# Patient Record
Sex: MALE | URBAN - METROPOLITAN AREA
[De-identification: names, ages, dates, MRNs, and addresses within clinical notes are randomized per-mention and may not be internally consistent; named-entity substitution may affect disease eponyms.]

---

## 2022-08-08 ENCOUNTER — APPOINTMENT (RX ONLY)
Dept: URBAN - METROPOLITAN AREA CLINIC 168 | Facility: CLINIC | Age: 32
Setting detail: DERMATOLOGY
End: 2022-08-08

## 2022-08-08 DIAGNOSIS — L40.0 PSORIASIS VULGARIS: ICD-10-CM

## 2022-08-08 PROCEDURE — ? COUNSELING

## 2022-08-08 PROCEDURE — ? ADDITIONAL NOTES

## 2022-08-08 PROCEDURE — ? TALTZ INJECTION

## 2022-08-08 PROCEDURE — ? SEPARATE AND IDENTIFIABLE DOCUMENTATION

## 2022-08-08 PROCEDURE — ? TALTZ INITIATION

## 2022-08-08 PROCEDURE — ? ORDER TESTS

## 2022-08-08 PROCEDURE — 96372 THER/PROPH/DIAG INJ SC/IM: CPT

## 2022-08-08 PROCEDURE — 99204 OFFICE O/P NEW MOD 45 MIN: CPT | Mod: 25

## 2022-08-08 ASSESSMENT — LOCATION ZONE DERM
LOCATION ZONE: ARM
LOCATION ZONE: FEET
LOCATION ZONE: LEG

## 2022-08-08 ASSESSMENT — LOCATION SIMPLE DESCRIPTION DERM
LOCATION SIMPLE: LEFT HEEL
LOCATION SIMPLE: RIGHT ANKLE
LOCATION SIMPLE: LEFT ANKLE
LOCATION SIMPLE: LEFT ELBOW
LOCATION SIMPLE: RIGHT HEEL
LOCATION SIMPLE: RIGHT THIGH
LOCATION SIMPLE: LEFT THIGH
LOCATION SIMPLE: RIGHT ELBOW

## 2022-08-08 ASSESSMENT — LOCATION DETAILED DESCRIPTION DERM
LOCATION DETAILED: LEFT ANKLE
LOCATION DETAILED: RIGHT ELBOW
LOCATION DETAILED: LEFT ANTERIOR PROXIMAL THIGH
LOCATION DETAILED: RIGHT ANKLE
LOCATION DETAILED: LEFT HEEL
LOCATION DETAILED: RIGHT HEEL
LOCATION DETAILED: RIGHT ANTERIOR PROXIMAL THIGH
LOCATION DETAILED: LEFT ELBOW

## 2022-08-08 ASSESSMENT — PGA PSORIASIS: PGA PSORIASIS 2020: MILD

## 2022-08-08 ASSESSMENT — BSA PSORIASIS: % BODY COVERED IN PSORIASIS: 6

## 2022-08-08 NOTE — HPI: OTHER
Condition:: psoriasis
Please Describe Your Condition:: is a new patient who is being seen for a chief complaint of psoriasis and PsA. \\n\\n- Patient has had psoriasis since childhood.\\n- Recently managed by a Gagan RATLIFF\\n- Currently on Humira, duration approx 2 years.  Losing his response \\n- Disease includes h/o significant scalp, nail and plaque disease on body, along with joint disease primarily in hands, feet, hips.\\n\\n- Over the years, patient was tried many topicals and has tried two different type of biologics (Otezla, Humira) \\n\\n- No stressors such as lifestyle, smoking or drinking which appear to induce flares. \\n- Current active areas include: hands, arms, nails and feet, along with joints.\\n\\n- As his disease is creeping back while on Humira, he is interested in a transition of his therapy.\\n\\n- No h/o TB, Hepatitis, [place] Fever.

## 2022-08-08 NOTE — PROCEDURE: ADDITIONAL NOTES
Detail Level: Simple
Render Risk Assessment In Note?: no
Patient Management Risk Assessment: Moderate
Additional Notes: - PGA is a 2-3 now, while on Humira, with disease in active worsening phase. \\n- Patient has had psoriasis since childhood - managed w/ Humira x 2 years, by Gagan RATLIFF \\n- Over the years, patient was tried many topical steroids, and has tried two different type of systemic agents: Otezla and Humira.\\n- Patient has been on Humira for about 2 years, and is losing his former response.   Last dose two weeks ago today.\\n\\n- Active areas include: hands, arms, nails and feet \\n- Patient reports psoriatic arthritis in feet and hands, hips\\n\\n- Patient is wanting to discontinue Humira and start new medication to clear psoriasis, treat PsA\\n- Recommend initiation of Taltz.  Counseling provided as to potential risks/benefits/alternatives.  Handout/brochure given on Taltz.  Questions answered.  Pt expressed interest in initiating today if possible.\\n\\nPlan: \\n- Discontinue Humira \\n- Initiate Taltz.  Two (induction) doses, each of 80 mg, given today.  Instructed to continue at home w/ sample given at 80 mg q 14 days until week 12, then q 28 days thereafter. \\n- Pt has agreed to enroll in [employer] research trial, as well as the [employer] registry\\n- Complete labs such that authorization may be initiated.\\n- F/u 1 month for visit w/ Estuardo, along with [employer] trial research visit

## 2022-08-08 NOTE — PROCEDURE: ORDER TESTS
Expected Date Of Service: 08/08/2022
Billing Type: Third-Party Bill
Performing Laboratory: [id number]
Bill For Surgical Tray: no
Lab Facility: [id number]

## 2022-08-08 NOTE — PROCEDURE: TALTZ INITIATION
Pregnancy And Lactation Warning Text: The risk during pregnancy and breastfeeding is uncertain with this medication.
Taltz Dosing: 160mg SC x 1 at weeks 0 then 80mg SC at weeks 2, 4, 6, 8, 10 and 12 then 80mg SC every four weeks
Is Soriatane Contraindicated?: No
Diagnosis (Required): Psoriasis
Taltz Monitoring Guidelines: A yearly test for tuberculosis is required while taking Taltz.
Detail Level: Zone

## 2022-08-08 NOTE — PROCEDURE: TALTZ INJECTION
Render If Medication Purchased By Clinic In Visit Note?: Yes
Consent: The risks of pain and injection site reactions were reviewed with the patient prior to the injection.
Ndc (80 Mg/Ml Syringe): [address]-78
Detail Level: None
Was The Medication Purchased By The Clinic?: No
Expiration Date (Optional): 03/07/2023
Additional Comments: 2 separate boxes to make 160 mg/ml
Syringe Size Used (Required For Enhanced Ndc): 80 mg/ml Prefilled Pen
29580 Billing Preferences: 1
Taltz Amount: 160 mg
J-Code: 
Ndc (80 Mg/Ml Pen): [address]-22
Lot # (Optional): [id number]
Administered By (Optional): CLP

## 2022-09-12 ENCOUNTER — APPOINTMENT (RX ONLY)
Dept: URBAN - METROPOLITAN AREA CLINIC 168 | Facility: CLINIC | Age: 32
Setting detail: DERMATOLOGY
End: 2022-09-12

## 2022-09-12 DIAGNOSIS — L40.0 PSORIASIS VULGARIS: ICD-10-CM | Status: IMPROVED

## 2022-09-12 PROCEDURE — ? ADDITIONAL NOTES

## 2022-09-12 PROCEDURE — 99214 OFFICE O/P EST MOD 30 MIN: CPT

## 2022-09-12 PROCEDURE — ? TALTZ MONITORING

## 2022-09-12 PROCEDURE — ? COUNSELING

## 2022-09-12 ASSESSMENT — LOCATION SIMPLE DESCRIPTION DERM
LOCATION SIMPLE: RIGHT ANKLE
LOCATION SIMPLE: LEFT ELBOW
LOCATION SIMPLE: RIGHT HEEL
LOCATION SIMPLE: RIGHT ELBOW
LOCATION SIMPLE: LEFT HEEL
LOCATION SIMPLE: LEFT ANKLE

## 2022-09-12 ASSESSMENT — LOCATION DETAILED DESCRIPTION DERM
LOCATION DETAILED: LEFT ELBOW
LOCATION DETAILED: LEFT HEEL
LOCATION DETAILED: RIGHT HEEL
LOCATION DETAILED: LEFT ANKLE
LOCATION DETAILED: RIGHT ELBOW
LOCATION DETAILED: RIGHT ANKLE

## 2022-09-12 ASSESSMENT — LOCATION ZONE DERM
LOCATION ZONE: ARM
LOCATION ZONE: LEG
LOCATION ZONE: FEET

## 2022-09-12 ASSESSMENT — BSA PSORIASIS: % BODY COVERED IN PSORIASIS: 3

## 2022-09-12 ASSESSMENT — PGA PSORIASIS: PGA PSORIASIS 2020: MILD

## 2022-09-12 NOTE — PROCEDURE: TALTZ MONITORING
Length Of Therapy: 1 month
Add High Risk Medication Management Associated Diagnosis?: No
Detail Level: Zone
Comments: Initiate on 8/8/2022

## 2022-09-12 NOTE — PROCEDURE: ADDITIONAL NOTES
Detail Level: Simple
Render Risk Assessment In Note?: no
Patient Management Risk Assessment: Moderate
Additional Notes: *** (50% improvement)\\n\\nTried and failed numerous topicals and biologic medications: Humira , Ketoconazole 2%, Fluocinonide, Halobetasol 0.05%, Otezla, Cetirizine 10 mg\\n\\nPatient would like to stay up-to-date with receiving immunizations which would be a contraindication to taking MTX as this reduces the efficiency of vaccinations.\\n\\nD/C Humira\\Juan Citiate Taltz on 8/8/2022\\n\\n-He has tolerated medication well. He has noticed he is more dehydrated, but no other adverse effects. \\n-Patient has noticed moderate improvement of his joint pain since initiating Taltz last visit. Severity has reduced from a 10/10 to an average of a 5/10.

## 2022-09-30 ENCOUNTER — RX ONLY (OUTPATIENT)
Age: 32
Setting detail: RX ONLY
End: 2022-09-30

## 2022-09-30 RX ORDER — IXEKIZUMAB 80 MG/ML
INJECTION, SOLUTION SUBCUTANEOUS AS DIRECTED
Qty: 1 | Refills: 1 | COMMUNITY
Start: 2022-09-30

## 2022-09-30 RX ORDER — IXEKIZUMAB 80 MG/ML
INJECTION, SOLUTION SUBCUTANEOUS AS DIRECTED
Qty: 1 | Refills: 5

## 2022-10-11 ENCOUNTER — RX ONLY (OUTPATIENT)
Age: 32
Setting detail: RX ONLY
End: 2022-10-11

## 2022-10-11 RX ORDER — DESONIDE 0.5 MG/G
OINTMENT TOPICAL
Qty: 60 | Refills: 0 | Status: ERX | COMMUNITY
Start: 2022-10-11

## 2022-10-24 ENCOUNTER — RX ONLY (OUTPATIENT)
Age: 32
Setting detail: RX ONLY
End: 2022-10-24

## 2022-10-24 RX ORDER — IXEKIZUMAB 80 MG/ML
INJECTION, SOLUTION SUBCUTANEOUS AS DIRECTED
Qty: 1 | Refills: 5 | Status: ERX

## 2023-01-24 ENCOUNTER — APPOINTMENT (RX ONLY)
Dept: URBAN - METROPOLITAN AREA CLINIC 168 | Facility: CLINIC | Age: 33
Setting detail: DERMATOLOGY
End: 2023-01-24

## 2023-01-24 DIAGNOSIS — L40.0 PSORIASIS VULGARIS: ICD-10-CM | Status: IMPROVED

## 2023-01-24 PROBLEM — L30.9 DERMATITIS, UNSPECIFIED: Status: ACTIVE | Noted: 2023-01-24

## 2023-01-24 PROCEDURE — ? COUNSELING

## 2023-01-24 PROCEDURE — ? ORDER TESTS

## 2023-01-24 PROCEDURE — ? ADDITIONAL NOTES

## 2023-01-24 PROCEDURE — 99214 OFFICE O/P EST MOD 30 MIN: CPT

## 2023-01-24 PROCEDURE — ? TALTZ MONITORING

## 2023-01-24 ASSESSMENT — LOCATION DETAILED DESCRIPTION DERM
LOCATION DETAILED: LEFT ANKLE
LOCATION DETAILED: RIGHT HEEL
LOCATION DETAILED: LEFT POSTERIOR SHOULDER
LOCATION DETAILED: RIGHT ANKLE
LOCATION DETAILED: LEFT HEEL
LOCATION DETAILED: RIGHT ELBOW
LOCATION DETAILED: LEFT ELBOW

## 2023-01-24 ASSESSMENT — LOCATION ZONE DERM
LOCATION ZONE: LEG
LOCATION ZONE: ARM
LOCATION ZONE: FEET

## 2023-01-24 ASSESSMENT — LOCATION SIMPLE DESCRIPTION DERM
LOCATION SIMPLE: RIGHT HEEL
LOCATION SIMPLE: LEFT SHOULDER
LOCATION SIMPLE: LEFT ELBOW
LOCATION SIMPLE: LEFT HEEL
LOCATION SIMPLE: LEFT ANKLE
LOCATION SIMPLE: RIGHT ELBOW
LOCATION SIMPLE: RIGHT ANKLE

## 2023-01-24 ASSESSMENT — PGA PSORIASIS: PGA PSORIASIS 2020: CLEAR

## 2023-01-24 ASSESSMENT — BSA PSORIASIS: % BODY COVERED IN PSORIASIS: 0

## 2023-01-24 NOTE — PROCEDURE: TALTZ MONITORING
Length Of Therapy: 5 months
Add High Risk Medication Management Associated Diagnosis?: No
Detail Level: Zone
Comments: ***Initiated on 8/8/2022

## 2023-01-24 NOTE — PROCEDURE: ORDER TESTS
Lab Facility: [id number]
Bill For Surgical Tray: no
Billing Type: Third-Party Bill
Expected Date Of Service: 01/24/2023
Performing Laboratory: [id number]

## 2023-01-24 NOTE — PROCEDURE: ADDITIONAL NOTES
Detail Level: Simple
Render Risk Assessment In Note?: no
Patient Management Risk Assessment: Moderate
Additional Notes: ***\\nTried and Failed: Humira, Otezla, Ketoconozale 2%, Halobetasol 0.05%, Floucinonide, Betamethasone, Flurandrenolide, Cetrizine 10mg\\n- It is important to pt to stay up to date on immunizations, contraindication to MTX due to reduction in vaccine efficiency \\n- Prior areas of involvement: feet, ankles, elbows, nail pitting\\n- 8/8/22 (Prior to Initiating Taltz): BSA: 6% PGA: 2.0\\n- 9/22/22 (One month Into Taltz): BSA: 3.0% PGA: 2.0\\n- 1/24/23 (5 months into Taltz): BSA: 0.0% PGA: 0.0\\n\\n***Significantly Improved***\\n- Pt noticed moderate improvement in joint pain since Taltz, severity reduced from 10/10 to 5/10\\n- Reports morning stiffness persisting in right hand, exacerbated by cold weather, will reassess in spring \\n- Resolution of focal itching and dryness\\n- Tolerating medication well, Pt denies side affects of Gi upset and URIs\\n\\nPlan:\\n- Continue Taltz dosing as directed \\n- Advised to take Ibuprofen for days where joint pain is more severe\\n- Recommend pt soak hands in warm water and engage in physical activity in the morning to help with stiffness
rolling walker

## 2023-08-23 ENCOUNTER — RX ONLY (OUTPATIENT)
Age: 33
Setting detail: RX ONLY
End: 2023-08-23

## 2023-08-23 ENCOUNTER — APPOINTMENT (RX ONLY)
Dept: URBAN - METROPOLITAN AREA CLINIC 168 | Facility: CLINIC | Age: 33
Setting detail: DERMATOLOGY
End: 2023-08-23

## 2023-08-23 DIAGNOSIS — L40.0 PSORIASIS VULGARIS: ICD-10-CM

## 2023-08-23 PROCEDURE — ? ORDER TESTS

## 2023-08-23 PROCEDURE — 99214 OFFICE O/P EST MOD 30 MIN: CPT

## 2023-08-23 PROCEDURE — ? ADDITIONAL NOTES

## 2023-08-23 PROCEDURE — ? TALTZ MONITORING

## 2023-08-23 PROCEDURE — ? COUNSELING

## 2023-08-23 RX ORDER — IXEKIZUMAB 80 MG/ML
INJECTION, SOLUTION SUBCUTANEOUS
Qty: 1 | Refills: 5 | Status: ERX

## 2023-08-23 ASSESSMENT — LOCATION DETAILED DESCRIPTION DERM
LOCATION DETAILED: RIGHT ELBOW
LOCATION DETAILED: LEFT ELBOW
LOCATION DETAILED: LEFT ANKLE
LOCATION DETAILED: RIGHT ANKLE
LOCATION DETAILED: RIGHT HEEL
LOCATION DETAILED: LEFT HEEL

## 2023-08-23 ASSESSMENT — LOCATION ZONE DERM
LOCATION ZONE: FEET
LOCATION ZONE: ARM
LOCATION ZONE: LEG

## 2023-08-23 ASSESSMENT — LOCATION SIMPLE DESCRIPTION DERM
LOCATION SIMPLE: RIGHT HEEL
LOCATION SIMPLE: RIGHT ELBOW
LOCATION SIMPLE: RIGHT ANKLE
LOCATION SIMPLE: LEFT ANKLE
LOCATION SIMPLE: LEFT HEEL
LOCATION SIMPLE: LEFT ELBOW

## 2023-08-23 NOTE — PROCEDURE: TALTZ MONITORING
Length Of Therapy: 1 year
Add High Risk Medication Management Associated Diagnosis?: No
Detail Level: Zone
Comments: ***Initiated on 8/8/2022

## 2023-08-23 NOTE — PROCEDURE: ORDER TESTS
Lab Facility: [id number]
Billing Type: Third-Party Bill
Expected Date Of Service: 08/23/2023
Bill For Surgical Tray: no
Performing Laboratory: [id number]
Expected Date Of Service: 01/01/2024
Billing Type: Third-Party Bill

## 2023-08-23 NOTE — PROCEDURE: ADDITIONAL NOTES
Detail Level: Simple
Render Risk Assessment In Note?: no
Patient Management Risk Assessment: Moderate
Additional Notes: ***\\nTried and Failed: Humira, Otezla, Ketoconozale 2%, Halobetasol 0.05%, Floucinonide, Betamethasone, Flurandrenolide, Cetrizine 10mg\\n- It is important to pt to stay up to date on immunizations, contraindication to MTX due to reduction in vaccine efficiency \\n- Prior areas of involvement: feet, ankles, elbows, nail pitting\\n- 8/8/22 (Prior to Initiating Taltz): BSA: 6% PGA: 2.0\\n- 9/22/22 (One month Into Taltz): BSA: 3.0% PGA: 2.0\\n- 1/24/23 (5 months into Taltz): BSA: 0.0% PGA: 0.0\\n\\n***Significantly Improved***\\n- Great improvement and management for months but skipped July due to having no refills \\n- Patient reports upper GI upset but likely not related to Taltz \\n- One sample given to patient today to dose asap and he will give us his next pen that's shipped \\n- Labs for quant, CBC and CMP due now then CBC, CMP in 6 months  \\n\\nPlan:\\n- Continue Taltz dosing as directed \\n- Advised to take Ibuprofen for days where joint pain is more severe

## 2024-02-20 ENCOUNTER — APPOINTMENT (RX ONLY)
Dept: URBAN - METROPOLITAN AREA CLINIC 168 | Facility: CLINIC | Age: 34
Setting detail: DERMATOLOGY
End: 2024-02-20

## 2024-02-20 DIAGNOSIS — L40.0 PSORIASIS VULGARIS: ICD-10-CM

## 2024-02-20 PROCEDURE — ? ADDITIONAL NOTES

## 2024-02-20 PROCEDURE — 99214 OFFICE O/P EST MOD 30 MIN: CPT

## 2024-02-20 PROCEDURE — ? COUNSELING

## 2024-02-20 PROCEDURE — ? TALTZ MONITORING

## 2024-02-20 PROCEDURE — ? ORDER TESTS

## 2024-02-20 ASSESSMENT — LOCATION DETAILED DESCRIPTION DERM: LOCATION DETAILED: RIGHT AXILLARY VAULT

## 2024-02-20 ASSESSMENT — LOCATION SIMPLE DESCRIPTION DERM: LOCATION SIMPLE: RIGHT AXILLARY VAULT

## 2024-02-20 ASSESSMENT — LOCATION ZONE DERM: LOCATION ZONE: AXILLAE

## 2024-02-20 NOTE — PROCEDURE: TALTZ MONITORING
Length Of Therapy: 1 year
Add High Risk Medication Management Associated Diagnosis?: Yes
Detail Level: Zone
Comments: Initiated on 8/8/2022

## 2024-02-20 NOTE — HPI: OTHER
Condition:: Psoriasis follow up
Please Describe Your Condition:: is an established patient who is being seen for a chief complaint of Psoriasis follow up. \\n\\n- Very well controlled, no side effects reports \\n- Only affected areas: axilla \\n- Joints improved but much more improved \\n- Not using desoinde as much or at all (mainly on axilla at night)\\n\\nPlan LOV:\\n- Continue Taltz dosing as directed\\n- Advised to take Ibuprofen for days where joint pain is more severe

## 2024-02-20 NOTE — PROCEDURE: ORDER TESTS
Billing Type: Third-Party Bill
Expected Date Of Service: 02/20/2024
Performing Laboratory: [id number]
Bill For Surgical Tray: no
Lab Facility: [id number]

## 2024-02-20 NOTE — PROCEDURE: ADDITIONAL NOTES
Detail Level: Simple
Render Risk Assessment In Note?: no
Additional Notes: HISTORY:\\n- Tried and Failed: Humira, Otezla, Ketoconozale 2%, Halobetasol 0.05%, Floucinonide, Betamethasone, Flurandrenolide, Cetrizine 10mg\\n\\n- Prior areas of involvement: feet, ankles, elbows, nail pitting\\n- 8/8/22 (Prior to Initiating Taltz): BSA: 6% PGA: 2.0\\n- 9/22/22 (One month Into Taltz): BSA: 3.0% PGA: 2.0\\n- 1/24/23 (5 months into Taltz): BSA: 0.0% PGA: 0.0\\n- 8/23/23: BSA:0%, PGA:0\\n\\n\\n****WELL CONTROLLED****\\n- Only affected area: axilla \\n- Well controlled overall but does experience itch at night mainly in axilla \\n- Patient is still happy with medication, quality of life improved on mediation \\n\\nPlan:\\n- Continue Taltz every 4 weeks \\n\\n\\nNC

## 2024-08-12 ENCOUNTER — RX ONLY (OUTPATIENT)
Age: 34
Setting detail: RX ONLY
End: 2024-08-12

## 2024-08-12 RX ORDER — IXEKIZUMAB 80 MG/ML
INJECTION, SOLUTION SUBCUTANEOUS
Qty: 1 | Refills: 5 | Status: ERX | COMMUNITY
Start: 2024-08-12

## 2024-08-22 ENCOUNTER — RX ONLY (OUTPATIENT)
Age: 34
Setting detail: RX ONLY
End: 2024-08-22

## 2024-08-22 RX ORDER — HALOBETASOL PROPIONATE 0.5 MG/G
CREAM TOPICAL
Qty: 50 | Refills: 1 | Status: ERX | COMMUNITY
Start: 2024-08-22

## 2024-08-22 RX ORDER — DESONIDE OINTMENT, 0.05% 0.5 MG/G
OINTMENT TOPICAL
Qty: 60 | Refills: 1 | Status: ERX

## 2024-12-02 ENCOUNTER — RX ONLY (OUTPATIENT)
Age: 34
Setting detail: RX ONLY
End: 2024-12-02

## 2024-12-02 RX ORDER — IXEKIZUMAB 80 MG/ML
INJECTION, SOLUTION SUBCUTANEOUS
Qty: 1 | Refills: 5 | Status: ERX

## 2024-12-30 ENCOUNTER — RX ONLY (RX ONLY)
Age: 34
End: 2024-12-30

## 2024-12-30 RX ORDER — IXEKIZUMAB 80 MG/ML
INJECTION, SOLUTION SUBCUTANEOUS
Qty: 1 | Refills: 5 | Status: ERX

## 2025-01-03 ENCOUNTER — RX ONLY (RX ONLY)
Age: 35
End: 2025-01-03

## 2025-01-03 RX ORDER — IXEKIZUMAB 80 MG/ML
INJECTION, SOLUTION SUBCUTANEOUS
Qty: 1 | Refills: 5 | Status: ERX

## 2025-01-08 ENCOUNTER — OFFICE VISIT (OUTPATIENT)
Dept: URBAN - METROPOLITAN AREA CLINIC 10 | Facility: CLINIC | Age: 35
End: 2025-01-08
Payer: COMMERCIAL

## 2025-01-08 DIAGNOSIS — H18.623 KERATOCONUS, UNSTABLE, BILATERAL: Primary | ICD-10-CM

## 2025-01-08 PROCEDURE — 76514 ECHO EXAM OF EYE THICKNESS: CPT | Performed by: OPHTHALMOLOGY

## 2025-01-08 PROCEDURE — 92025 CPTRIZED CORNEAL TOPOGRAPHY: CPT | Performed by: OPHTHALMOLOGY

## 2025-01-08 PROCEDURE — 99204 OFFICE O/P NEW MOD 45 MIN: CPT | Performed by: OPHTHALMOLOGY

## 2025-01-08 ASSESSMENT — VISUAL ACUITY
OD: 20/20
OS: 20/40

## 2025-01-08 ASSESSMENT — INTRAOCULAR PRESSURE
OD: 12
OS: 12

## 2025-02-13 ENCOUNTER — APPOINTMENT (OUTPATIENT)
Dept: URBAN - METROPOLITAN AREA CLINIC 168 | Facility: CLINIC | Age: 35
Setting detail: DERMATOLOGY
End: 2025-02-13

## 2025-02-13 DIAGNOSIS — L40.0 PSORIASIS VULGARIS: ICD-10-CM

## 2025-02-13 PROCEDURE — ? COUNSELING

## 2025-02-13 PROCEDURE — 99214 OFFICE O/P EST MOD 30 MIN: CPT

## 2025-02-13 PROCEDURE — ? ORDER TESTS

## 2025-02-13 PROCEDURE — ? PRESCRIPTION MEDICATION MANAGEMENT

## 2025-02-13 PROCEDURE — ? ADDITIONAL NOTES

## 2025-02-13 PROCEDURE — ? TALTZ MONITORING

## 2025-02-13 ASSESSMENT — BSA PSORIASIS: % BODY COVERED IN PSORIASIS: 0

## 2025-02-13 ASSESSMENT — ITCH NUMERIC RATING SCALE: HOW SEVERE IS YOUR ITCHING?: 0

## 2025-02-13 ASSESSMENT — PGA PSORIASIS: PGA PSORIASIS 2020: CLEAR

## 2025-02-13 NOTE — PROCEDURE: PRESCRIPTION MEDICATION MANAGEMENT
Detail Level: Zone
Plan: ****WELL CONTROLLED***\\n- Patient is still happy with medication, quality of life improved on mediation \\n- No labs ready to review, plan to get order today for updated labs \\n\\nPlan:\\n- Continue Taltz every 4 weeks \\n- Complete labs ASAP
Render In Strict Bullet Format?: No

## 2025-02-13 NOTE — PROCEDURE: ORDER TESTS
Billing Type: Third-Party Bill
Performing Laboratory: [id number]
Bill For Surgical Tray: no
Expected Date Of Service: 02/13/2025
Lab Facility: [id number]

## 2025-02-13 NOTE — PROCEDURE: ADDITIONAL NOTES
Detail Level: Simple
Render Risk Assessment In Note?: no
Additional Notes: HISTORY:\\n- Tried and Failed: Humira, Otezla, Ketoconozale 2%, Halobetasol 0.05%, Floucinonide, Betamethasone, Flurandrenolide, Cetrizine 10mg\\n\\n- Prior areas of involvement: feet, ankles, elbows, nail pitting\\n- 8/8/22 (Prior to Initiating Taltz): BSA: 6% PGA: 2.0\\n- 9/22/22 (One month Into Taltz): BSA: 3.0% PGA: 2.0\\n- 1/24/23 (5 months into Taltz): BSA: 0.0% PGA: 0.0\\n- 8/23/23: BSA:0%, PGA:0\\n- TODAY: BSA:0% and PGA:0

## 2025-04-16 ENCOUNTER — OFFICE VISIT (OUTPATIENT)
Dept: URBAN - METROPOLITAN AREA CLINIC 10 | Facility: CLINIC | Age: 35
End: 2025-04-16
Payer: COMMERCIAL

## 2025-04-16 DIAGNOSIS — H18.623 KERATOCONUS, UNSTABLE, BILATERAL: Primary | ICD-10-CM

## 2025-04-16 PROCEDURE — 0402T COLGN CRS-LINK CRN&PACHYMTRY: CPT | Performed by: OPHTHALMOLOGY

## 2025-04-21 ENCOUNTER — POST-OPERATIVE VISIT (OUTPATIENT)
Dept: URBAN - METROPOLITAN AREA CLINIC 10 | Facility: CLINIC | Age: 35
End: 2025-04-21
Payer: COMMERCIAL

## 2025-04-21 DIAGNOSIS — H18.623 KERATOCONUS, UNSTABLE, BILATERAL: Primary | ICD-10-CM

## 2025-04-21 PROCEDURE — 99203 OFFICE O/P NEW LOW 30 MIN: CPT | Performed by: OPTOMETRIST

## 2025-05-19 ENCOUNTER — OFFICE VISIT (OUTPATIENT)
Dept: URBAN - METROPOLITAN AREA CLINIC 10 | Facility: CLINIC | Age: 35
End: 2025-05-19
Payer: COMMERCIAL

## 2025-05-19 DIAGNOSIS — H18.623 KERATOCONUS, UNSTABLE, BILATERAL: Primary | ICD-10-CM

## 2025-05-19 PROCEDURE — 99213 OFFICE O/P EST LOW 20 MIN: CPT | Performed by: OPHTHALMOLOGY

## 2025-05-19 RX ORDER — PREDNISOLONE ACETATE 10 MG/ML
1 % SUSPENSION/ DROPS OPHTHALMIC
Qty: 5 | Refills: 2 | Status: ACTIVE
Start: 2025-05-19

## 2025-05-19 ASSESSMENT — INTRAOCULAR PRESSURE
OD: 14
OS: 14

## 2025-08-04 ENCOUNTER — APPOINTMENT (OUTPATIENT)
Dept: URBAN - METROPOLITAN AREA CLINIC 168 | Facility: CLINIC | Age: 35
Setting detail: DERMATOLOGY
End: 2025-08-04

## 2025-08-04 DIAGNOSIS — L64.8 OTHER ANDROGENIC ALOPECIA: ICD-10-CM | Status: INADEQUATELY CONTROLLED

## 2025-08-04 DIAGNOSIS — L40.0 PSORIASIS VULGARIS: ICD-10-CM

## 2025-08-04 PROBLEM — L65.9 NONSCARRING HAIR LOSS, UNSPECIFIED: Status: ACTIVE | Noted: 2025-08-04

## 2025-08-04 PROCEDURE — ? ADDITIONAL NOTES

## 2025-08-04 PROCEDURE — ? PRESCRIPTION MEDICATION MANAGEMENT

## 2025-08-04 PROCEDURE — ? COUNSELING

## 2025-08-04 PROCEDURE — ? DEFER

## 2025-08-04 PROCEDURE — ? TALTZ MONITORING

## 2025-08-04 PROCEDURE — ? ORDER TESTS

## 2025-08-04 ASSESSMENT — BSA PSORIASIS: % BODY COVERED IN PSORIASIS: 0

## 2025-08-04 ASSESSMENT — PGA PSORIASIS: PGA PSORIASIS 2020: CLEAR

## 2025-08-04 ASSESSMENT — ITCH NUMERIC RATING SCALE: HOW SEVERE IS YOUR ITCHING?: 0

## 2025-08-05 ENCOUNTER — APPOINTMENT (OUTPATIENT)
Dept: URBAN - METROPOLITAN AREA CLINIC 168 | Facility: CLINIC | Age: 35
Setting detail: DERMATOLOGY
End: 2025-08-05

## 2025-08-05 DIAGNOSIS — L65.9 NONSCARRING HAIR LOSS, UNSPECIFIED: ICD-10-CM

## 2025-08-05 PROCEDURE — ? COUNSELING

## 2025-08-05 PROCEDURE — ? BIOPSY BY PUNCH METHOD

## 2025-08-05 ASSESSMENT — LOCATION ZONE DERM: LOCATION ZONE: SCALP

## 2025-08-05 ASSESSMENT — LOCATION SIMPLE DESCRIPTION DERM: LOCATION SIMPLE: RIGHT SCALP

## 2025-08-05 ASSESSMENT — LOCATION DETAILED DESCRIPTION DERM: LOCATION DETAILED: RIGHT CENTRAL FRONTAL SCALP

## 2025-08-19 ENCOUNTER — RX ONLY (RX ONLY)
Age: 35
End: 2025-08-19

## 2025-08-19 RX ORDER — IXEKIZUMAB 80 MG/ML
INJECTION, SOLUTION SUBCUTANEOUS
Qty: 1 | Refills: 6 | Status: ERX

## 2025-08-20 ENCOUNTER — APPOINTMENT (OUTPATIENT)
Dept: URBAN - METROPOLITAN AREA CLINIC 168 | Facility: CLINIC | Age: 35
Setting detail: DERMATOLOGY
End: 2025-08-20

## 2025-08-20 DIAGNOSIS — L66.81 CENTRAL CENTRIFUGAL CICATRICIAL ALOPECIA: ICD-10-CM | Status: INADEQUATELY CONTROLLED

## 2025-08-20 PROCEDURE — ? COUNSELING

## 2025-08-20 PROCEDURE — ? ADDITIONAL NOTES

## 2025-08-20 PROCEDURE — ? PRESCRIPTION

## 2025-08-20 PROCEDURE — ? DIAGNOSIS COMMENT

## 2025-08-20 PROCEDURE — ? PRESCRIPTION MEDICATION MANAGEMENT

## 2025-08-20 RX ORDER — CLOBETASOL PROPIONATE 0.5 MG/ML
SOLUTION TOPICAL QHS
Qty: 50 | Refills: 3 | Status: ERX | COMMUNITY
Start: 2025-08-20

## 2025-08-20 RX ORDER — MINOXIDIL 2.5 MG/1
TABLET ORAL
Qty: 60 | Refills: 0 | Status: ERX | COMMUNITY
Start: 2025-08-20

## 2025-08-20 RX ADMIN — MINOXIDIL: 2.5 TABLET ORAL at 00:00

## 2025-08-20 RX ADMIN — CLOBETASOL PROPIONATE: 0.5 SOLUTION TOPICAL at 00:00

## 2025-08-20 ASSESSMENT — LOCATION ZONE DERM: LOCATION ZONE: SCALP

## 2025-08-20 ASSESSMENT — LOCATION DETAILED DESCRIPTION DERM: LOCATION DETAILED: LEFT SUPERIOR PARIETAL SCALP

## 2025-08-20 ASSESSMENT — LOCATION SIMPLE DESCRIPTION DERM: LOCATION SIMPLE: SCALP
